# Patient Record
Sex: FEMALE | Race: OTHER | NOT HISPANIC OR LATINO
[De-identification: names, ages, dates, MRNs, and addresses within clinical notes are randomized per-mention and may not be internally consistent; named-entity substitution may affect disease eponyms.]

---

## 2022-05-02 PROBLEM — Z00.00 ENCOUNTER FOR PREVENTIVE HEALTH EXAMINATION: Status: ACTIVE | Noted: 2022-05-02

## 2022-06-10 ENCOUNTER — APPOINTMENT (OUTPATIENT)
Dept: OBGYN | Facility: CLINIC | Age: 77
End: 2022-06-10
Payer: MEDICARE

## 2022-06-10 ENCOUNTER — NON-APPOINTMENT (OUTPATIENT)
Age: 77
End: 2022-06-10

## 2022-06-10 ENCOUNTER — ASOB RESULT (OUTPATIENT)
Age: 77
End: 2022-06-10

## 2022-06-10 VITALS — HEIGHT: 66 IN | SYSTOLIC BLOOD PRESSURE: 130 MMHG | DIASTOLIC BLOOD PRESSURE: 70 MMHG

## 2022-06-10 DIAGNOSIS — Z78.9 OTHER SPECIFIED HEALTH STATUS: ICD-10-CM

## 2022-06-10 DIAGNOSIS — G20 PARKINSON'S DISEASE: ICD-10-CM

## 2022-06-10 DIAGNOSIS — Z86.73 PERSONAL HISTORY OF TRANSIENT ISCHEMIC ATTACK (TIA), AND CEREBRAL INFARCTION W/OUT RESIDUAL DEFICITS: ICD-10-CM

## 2022-06-10 DIAGNOSIS — N95.0 POSTMENOPAUSAL BLEEDING: ICD-10-CM

## 2022-06-10 DIAGNOSIS — I10 ESSENTIAL (PRIMARY) HYPERTENSION: ICD-10-CM

## 2022-06-10 DIAGNOSIS — E78.5 HYPERLIPIDEMIA, UNSPECIFIED: ICD-10-CM

## 2022-06-10 DIAGNOSIS — Z72.3 LACK OF PHYSICAL EXERCISE: ICD-10-CM

## 2022-06-10 PROCEDURE — 99202 OFFICE O/P NEW SF 15 MIN: CPT

## 2022-06-10 PROCEDURE — 76830 TRANSVAGINAL US NON-OB: CPT

## 2022-06-10 RX ORDER — CARBIDOPA AND LEVODOPA 25; 100 MG/1; MG/1
25-100 TABLET ORAL
Refills: 0 | Status: ACTIVE | COMMUNITY

## 2022-06-10 RX ORDER — ASPIRIN 81 MG/1
81 TABLET, CHEWABLE ORAL
Refills: 0 | Status: ACTIVE | COMMUNITY

## 2022-06-10 RX ORDER — ACETAMINOPHEN 325 MG
325 SUPPOSITORY, RECTAL RECTAL
Refills: 0 | Status: ACTIVE | COMMUNITY

## 2022-06-10 RX ORDER — HYDROCHLOROTHIAZIDE 12.5 MG/1
12.5 TABLET ORAL
Refills: 0 | Status: ACTIVE | COMMUNITY

## 2022-06-10 RX ORDER — DOCUSATE SODIUM 100 MG/1
CAPSULE ORAL
Refills: 0 | Status: ACTIVE | COMMUNITY

## 2022-06-10 RX ORDER — DONEPEZIL HYDROCHLORIDE 5 MG/1
5 TABLET ORAL
Refills: 0 | Status: ACTIVE | COMMUNITY

## 2022-06-10 RX ORDER — IBUPROFEN 300 MG
300 TABLET ORAL
Refills: 0 | Status: ACTIVE | COMMUNITY

## 2022-06-10 RX ORDER — RISPERIDONE 1 MG/1
1 TABLET ORAL
Refills: 0 | Status: ACTIVE | COMMUNITY

## 2022-06-10 RX ORDER — LISINOPRIL 30 MG/1
30 TABLET ORAL
Refills: 0 | Status: ACTIVE | COMMUNITY

## 2022-06-10 RX ORDER — DICLOFENAC SODIUM 50 MG/1
50 TABLET, DELAYED RELEASE ORAL
Refills: 0 | Status: ACTIVE | COMMUNITY

## 2022-06-10 RX ORDER — AMANTADINE HYDROCHLORIDE 100 1/1
100 TABLET ORAL
Refills: 0 | Status: ACTIVE | COMMUNITY

## 2022-06-10 RX ORDER — ATORVASTATIN CALCIUM 40 MG/1
40 TABLET, FILM COATED ORAL
Refills: 0 | Status: ACTIVE | COMMUNITY

## 2022-06-13 ENCOUNTER — NON-APPOINTMENT (OUTPATIENT)
Age: 77
End: 2022-06-13

## 2022-06-13 ENCOUNTER — APPOINTMENT (OUTPATIENT)
Dept: OBGYN | Facility: CLINIC | Age: 77
End: 2022-06-13
Payer: MEDICARE

## 2022-06-13 ENCOUNTER — ASOB RESULT (OUTPATIENT)
Age: 77
End: 2022-06-13

## 2022-06-13 DIAGNOSIS — N94.89 OTHER SPECIFIED CONDITIONS ASSOCIATED WITH FEMALE GENITAL ORGANS AND MENSTRUAL CYCLE: ICD-10-CM

## 2022-06-13 DIAGNOSIS — N95.0 POSTMENOPAUSAL BLEEDING: ICD-10-CM

## 2022-06-13 LAB
CANDIDA VAG CYTO: NOT DETECTED
G VAGINALIS+PREV SP MTYP VAG QL MICRO: DETECTED
T VAGINALIS VAG QL WET PREP: NOT DETECTED

## 2022-06-13 PROCEDURE — 58340 CATHETER FOR HYSTEROGRAPHY: CPT

## 2022-06-13 PROCEDURE — 58100 BIOPSY OF UTERUS LINING: CPT

## 2022-06-13 PROCEDURE — 76831 ECHO EXAM UTERUS: CPT

## 2022-06-13 RX ORDER — IBUPROFEN 600 MG/1
600 TABLET, FILM COATED ORAL
Qty: 63 | Refills: 0 | Status: ACTIVE | COMMUNITY
Start: 2021-12-09

## 2022-06-13 RX ORDER — LISINOPRIL 10 MG/1
10 TABLET ORAL
Qty: 30 | Refills: 0 | Status: ACTIVE | COMMUNITY
Start: 2021-12-22

## 2022-06-13 RX ORDER — AMANTADINE HYDROCHLORIDE 100 MG/1
100 CAPSULE ORAL
Qty: 60 | Refills: 0 | Status: ACTIVE | COMMUNITY
Start: 2022-05-19

## 2022-06-13 RX ORDER — DICLOFENAC SODIUM 1% 10 MG/G
1 GEL TOPICAL
Qty: 200 | Refills: 0 | Status: ACTIVE | COMMUNITY
Start: 2021-12-21

## 2022-06-13 RX ORDER — KETOCONAZOLE 20.5 MG/ML
2 SHAMPOO, SUSPENSION TOPICAL
Qty: 120 | Refills: 0 | Status: ACTIVE | COMMUNITY
Start: 2022-03-08

## 2022-06-13 NOTE — PHYSICAL EXAM
[Chaperone Present] : A chaperone was present in the examining room during all aspects of the physical examination [Appropriately responsive] : appropriately responsive [Alert] : alert [No Acute Distress] : no acute distress [Soft] : soft [Oriented x3] : oriented x3 [Vulvar Atrophy] : vulvar atrophy [No Lesions] : no lesions  [Labia Majora] : normal [Labia Minora] : normal [Atrophy] : atrophy [Normal] : normal [Uterine Adnexae] : normal [FreeTextEntry1] : Marie PAVON [Tenderness] : nontender [Enlarged ___ wks] : not enlarged

## 2022-06-13 NOTE — HISTORY OF PRESENT ILLNESS
[FreeTextEntry1] : 75 y/o PM presents for complaint of on and off bleeding for the past few months. She reports that when it comes it is reddish brown, lasts a few days and then goes away.\par \par She has no bleeding today.\par \par She has no pelvic pain or dysuria.\par \par Pt lives in a nursing home. She has her nurses aid with her today.\par \par Obtained limited history from patient and her aid.

## 2022-06-13 NOTE — PROCEDURE
[Abnormal Uterine Bleeding] : abnormal uterine bleeding [Transvaginal Ultrasound] : transvaginal ultrasound [L: ___ cm] : L: [unfilled] cm [W: ___cm] : W: [unfilled] cm [H: ___ cm] : H: [unfilled] cm [FreeTextEntry5] : Endometrium 22.09 mm, abnormal thickened, vascularity visualized. [FreeTextEntry7] : 3.72  x 3.4 x 2.16 cm [FreeTextEntry8] : 0.92 x 3.41 x 2.39 cm [FreeTextEntry6] : Right ovarian complex cyst 3.12 x 3.02 x 1.61 cm [FreeTextEntry4] : Normal appearing uterus without evidence of myomas. The endometrium appears abnormal as noted above. No CDS free fluid is noted. The right ovary has an abnormal appearance as noted above. The left ovary has a normal appearance. Cervix and both adnexa unremarkable.

## 2022-06-13 NOTE — PLAN
[FreeTextEntry1] : -SIS with [facility] scheduled for Monday 6/13/22 with Dr. L Mendelowitz. Case was discussed with him as well. Further management will be determined by Dr. Mendelowitz at followup visit.\par \par \par -I have spent 20 minutes on this encounter.\par \par

## 2022-06-14 LAB
AFP-TM SERPL-MCNC: 7.6 NG/ML
CANCER AG125 SERPL-ACNC: 40 U/ML
CEA SERPL-MCNC: 3.7 NG/ML

## 2022-06-15 NOTE — PROCEDURE
[Abnormal Uterine Bleeding] : abnormal uterine bleeding [Transvaginal Ultrasound] : transvaginal ultrasound [Saline Infusion Sonography] : saline infusion sonography [Anteverted] : anteverted [FreeTextEntry3] : Long Mike speculum placed.\par Cervix prepped with Betadine & grasped.\par Catheter introduced through the cervix and advanced to the fundus.\par Speculum removed and ultrasound probe placed vaginally. \par Saline injected to visualize the endometrial cavity.\par Findings: 3 polypoid masses were seen - see report\par Ultrasound probe removed. \par Catheter removed slowly with application of suction.\par Tissue admixed w/ blood was aspirated.\par Patient tolerated the procedure well.\par No complications\par  [FreeTextEntry7] : Cystic mass as noted. , CEA, AFP drawn. [FreeTextEntry4] : Polypoid EM masses / ?EM neoplasia.\par Mildly complex right adnexal mass.

## 2022-06-15 NOTE — PHYSICAL EXAM
[Vulvar Atrophy] : vulvar atrophy [Atrophy] : atrophy [FreeTextEntry4] : tight 2 FB introitus [FreeTextEntry5] : difficult to reach [FreeTextEntry6] : unable to assess

## 2022-06-15 NOTE — HISTORY OF PRESENT ILLNESS
[TextBox_4] : Nursing home patient has apparently experienced several months of intermittent postmenopausal bleeding. 6/10/22 ultrasound: Uterus 7.91 x 4.85 x 4.49 cm with a 22.09 mm thick and vascular endometrium. Although the left ovary images normally, the right ovary contains a 3.12 x 3.02 x 1.61 cm complex cyst. \par 6/10/22 ultrasound images reviewed. The right adnexal mass has thin walls, a 2-3 mm septum and mildly echogenic fluid.\par It was imaged again today confirming that it was free of nodularity; no solid areas. Septum is 3 mm in maximal thickness.\par Saline infusion sonography with likely endometrial biopsy was explained to the patient and consent obtained.

## 2022-07-05 ENCOUNTER — NON-APPOINTMENT (OUTPATIENT)
Age: 77
End: 2022-07-05

## 2023-03-02 LAB — CORE LAB BIOPSY: NORMAL

## 2023-05-19 ENCOUNTER — APPOINTMENT (OUTPATIENT)
Dept: NEUROLOGY | Facility: CLINIC | Age: 78
End: 2023-05-19

## 2023-07-27 ENCOUNTER — APPOINTMENT (OUTPATIENT)
Dept: NEUROLOGY | Facility: CLINIC | Age: 78
End: 2023-07-27

## 2024-01-17 ENCOUNTER — APPOINTMENT (OUTPATIENT)
Dept: NEUROLOGY | Facility: CLINIC | Age: 79
End: 2024-01-17

## 2025-02-27 ENCOUNTER — TRANSCRIPTION ENCOUNTER (OUTPATIENT)
Age: 80
End: 2025-02-27

## 2025-03-20 ENCOUNTER — APPOINTMENT (OUTPATIENT)
Dept: OBGYN | Facility: CLINIC | Age: 80
End: 2025-03-20
Payer: MEDICARE

## 2025-03-20 VITALS — HEIGHT: 66 IN

## 2025-03-20 DIAGNOSIS — R93.89 ABNORMAL FINDINGS ON DIAGNOSTIC IMAGING OF OTHER SPECIFIED BODY STRUCTURES: ICD-10-CM

## 2025-03-20 DIAGNOSIS — N94.89 OTHER SPECIFIED CONDITIONS ASSOCIATED WITH FEMALE GENITAL ORGANS AND MENSTRUAL CYCLE: ICD-10-CM

## 2025-03-20 DIAGNOSIS — N95.0 POSTMENOPAUSAL BLEEDING: ICD-10-CM

## 2025-03-20 DIAGNOSIS — G20.C PARKINSONISM, UNSPECIFIED: ICD-10-CM

## 2025-03-20 PROCEDURE — 99213 OFFICE O/P EST LOW 20 MIN: CPT

## 2025-04-14 ENCOUNTER — APPOINTMENT (OUTPATIENT)
Dept: OBGYN | Facility: CLINIC | Age: 80
End: 2025-04-14

## 2025-04-14 ENCOUNTER — ASOB RESULT (OUTPATIENT)
Age: 80
End: 2025-04-14

## 2025-04-14 DIAGNOSIS — R93.89 ABNORMAL FINDINGS ON DIAGNOSTIC IMAGING OF OTHER SPECIFIED BODY STRUCTURES: ICD-10-CM

## 2025-04-14 DIAGNOSIS — Z12.4 ENCOUNTER FOR SCREENING FOR MALIGNANT NEOPLASM OF CERVIX: ICD-10-CM

## 2025-04-14 DIAGNOSIS — N95.0 POSTMENOPAUSAL BLEEDING: ICD-10-CM

## 2025-04-14 DIAGNOSIS — N84.0 POLYP OF CORPUS UTERI: ICD-10-CM

## 2025-04-14 PROCEDURE — 99214 OFFICE O/P EST MOD 30 MIN: CPT

## 2025-04-14 PROCEDURE — 76830 TRANSVAGINAL US NON-OB: CPT | Mod: TC

## 2025-04-14 PROCEDURE — 76830 TRANSVAGINAL US NON-OB: CPT

## 2025-04-14 PROCEDURE — 99459 PELVIC EXAMINATION: CPT

## 2025-04-16 LAB — HPV HIGH+LOW RISK DNA PNL CVX: NOT DETECTED

## 2025-04-22 LAB — CYTOLOGY CVX/VAG DOC THIN PREP: ABNORMAL
